# Patient Record
Sex: MALE | URBAN - METROPOLITAN AREA
[De-identification: names, ages, dates, MRNs, and addresses within clinical notes are randomized per-mention and may not be internally consistent; named-entity substitution may affect disease eponyms.]

---

## 2022-12-05 ENCOUNTER — TELEPHONE (OUTPATIENT)
Dept: ORTHOPEDIC SURGERY | Age: 57
End: 2022-12-05

## 2022-12-06 ENCOUNTER — TELEPHONE (OUTPATIENT)
Dept: ORTHOPEDIC SURGERY | Age: 57
End: 2022-12-06

## 2022-12-06 NOTE — TELEPHONE ENCOUNTER
Called pt to see if he has had MRI. Pt stated he is having MRI on 12-8-22. Pt will bring disc to [facility] and have report sent to [facility].

## 2022-12-13 ENCOUNTER — OFFICE VISIT (OUTPATIENT)
Dept: ORTHOPEDIC SURGERY | Age: 57
End: 2022-12-13

## 2022-12-13 VITALS — BODY MASS INDEX: 32.58 KG/M2 | HEIGHT: 69 IN | WEIGHT: 220 LBS

## 2022-12-13 DIAGNOSIS — M87.076 AVASCULAR NECROSIS OF BONE OF FOOT (HCC): ICD-10-CM

## 2022-12-13 DIAGNOSIS — S92.252A CLOSED DISPLACED FRACTURE OF NAVICULAR BONE OF LEFT FOOT, INITIAL ENCOUNTER: ICD-10-CM

## 2022-12-13 DIAGNOSIS — M79.672 LEFT FOOT PAIN: Primary | ICD-10-CM

## 2022-12-13 DIAGNOSIS — M19.172 POST-TRAUMATIC OSTEOARTHRITIS OF LEFT FOOT: ICD-10-CM

## 2022-12-13 NOTE — PROGRESS NOTES
The patient was prescribed a walker boot for the patient's left foot. The patient wears a size 11 shoe and I fitted them with a L size boot. The patient was fitted and instructed on the use of prescribed walker boot. I explained how to fit themselves and that the plastic flexible piece should always be on the front of the boot and secured by the Velcro straps on top. The air bladder in the boot was adjusted according to proper fit and comfort. The patient walked a short distance and acknowledged satisfaction with current fit. I also explained that they need a heel lift or a higher heeled shoe for the uninvolved LE to help normalize gait and avoid excessive low back stress/strain due to leg length inequality created from walker boot. Patient read and signed documenting they understand and agree to [facility]'s current DME return policy.

## 2022-12-13 NOTE — LETTER
DME Patient Authorization Form    Name: Magdaline Sacks  : 1965  MRN: [id number]   Age: 59 y.o. Gender: male  Delivery Address: [facility]     Diagnosis:     ICD-10-CM    1. Left foot pain  M79.672 XR ANKLE LEFT (MIN 3 VIEWS)     XR FOOT LEFT (2 VIEWS)     Seth Leung ()     Ambulatory referral to Orthopedic Surgery           Requested DME:  Seth Leung -  ($290.00) X 1 - left        Clinical Notes:     **Indicates non-covered items by insurance. Payment expected on date of service. Electronically signed by  Provider: Kaushal Canela MD__Date: 2022                            [facility]/[address] Tax ID # [id number]        Durable Medical Equipment and/or Orthotics Patient Consent     I understand that my physician has prescribed this medical supply as part of my treatment plan as a matter of Medical Necessity.  I understand that I have a choice in where I receive my prescribed orthopedic supplies and/or services.  I authorize [facility] to furnish this service/product and to provide my insurance carrier with any information requested in order to process for payment.  I instruct my insurance carrier to pay [facility] directly for these services/products.  I understand that my insurance carrier may deny payment for this supply because it is a non-covered item, deemed not medically necessary or considered experimental.   I understand that any cost not covered by my insurance carrier will be solely my financial responsibility.  I have received the Supplier Standards and have reviewed them.  I have received the prescribed item and have been fully instructed on the proper use of the above services/products.    ______ (Patient Initials) I understand that all DME items are non-returnable after being dispensed.  Items still in sealed packaging may be returned up to 14 days after purchasing. [address] will replace items that are defective.    ______ (Patient Initials) I understand that April Garcia will not file a claim with my insurance carrier for this service/product and I am waiving my right to file a claim on my own for this service/product with my insurance company as this item is NON-COVERED (Denoted by the **) by my Insurance company/policy. ______ (Patient Initials) I understand that I am responsible to bring my equipment to the hospital for any surgery. ______________________________________________  ________________________  Patient / Ruben [place]            Thank you for considering [address] Your physician has prescribed specific medical equipment or devices for your home use. The following describes your rights and responsibilities as our customer. Right to Choose Providers: You have a choice regarding which company supplies your home medical equipment and devices, and to consult your physician in this decision. You may choose a medical supply store, a home medical equipment provider, or a specialist such as [facility]. [facility] will coordinate with your physician to provide the medical equipment or devices prescribed for your home use. Right to Service:  You have the right to considerate, respectful and nondiscriminatory care. You have the right to receive accurate and easily understood information about your health care. If you speak a foreign language, or don't understand the discussions, assistance will be provided to allow you to make informed health care decisions. You have the right to know your treatment options and to participate in decisions about your care, including the right to accept or refuse treatment.   You have the right to expect a reasonable response to your requests applicable Federal and State licensure and regulatory requirements. A supplier must provide complete and accurate information on the DMEPOS supplier application. Any changes to this information must be reported to the [facility] within 30 days. An authorized individual (one whose signature is binding) must sign the application for billing privileges. A supplier must fill orders from its own inventory, or must contract with other companies for the purchase of items necessary to fill the order. A supplier may not contract with any entity that is currently excluded from the Medicare program, any [facility] program, or from any other Federal procurement or Nonprocurement programs. A supplier must advise beneficiaries that they may rent or purchase inexpensive or routinely purchased durable medical equipment, and of the purchase option for capped rental equipment. A supplier must notify beneficiaries of warranty coverage and honor all warranties under applicable State Law, and repair or replace free of charge Medicare covered items that are under warranty. A supplier must maintain a physical facility on an appropriate site. A supplier must permit CMS, or its agents to conduct on-site inspections to ascertain the supplier's compliance with these standards. The supplier location must be accessible to beneficiaries during reasonable business hours, and must maintain a visible sign and posted hours of operation. A supplier must maintain a primary business telephone listed under the name of the business in a [employer] or a toll free number available through directory assistance. The exclusive use of a beeper, answering machine or cell phone is prohibited. A supplier must have comprehensive liability insurance in the amount of at least $300,000 that covers both the supplier's place of business and all customers and employees of the supplier.   If the supplier manufactures its own items, this insurance must also cover product liability and completed operations. A supplier must agree not to initiate telephone contact with beneficiaries, with a few exceptions allowed. This standard prohibits suppliers from calling beneficiaries in order to solicit new business. A supplier is responsible for delivery and must instruct beneficiaries on use of Medicare covered items, and maintain proof of delivery. A supplier must answer questions, and respond to complaints of the beneficiaries, and maintain documentation of such contacts. A supplier must maintain and replace at no charge or repair directly, or through a service contract with another company, Medicare covered items it has rented to beneficiaries. A supplier must accept returns of substandard (less than full quality for the particular item) or unsuitable items (inappropriate for the beneficiary at the time it was fitted and rented or sold) from beneficiaries. A supplier must disclose these supplier standards to each beneficiary to whom it supplies a Medicare-covered item. A supplier must disclose to the government any person having ownership, financial, or control interest in the supplier. A supplier must not convey or reassign a supplier number; i.e., the supplier may not sell or allow another entity to use its Medicare billing number. A supplier must have a complaint resolution protocol established to address beneficiary complaints that relate to these standards. A record of these complaints must be maintained at the physical facility. Complaint records must include: the name, address, telephone number and health insurance claim number of the beneficiary, a summary of the complaint, and any action taken to resolve it. A supplier must agree to furnish CMS any information required by the Medicare statute and implementing regulations.   A supplier of DMEPOS and other items and services must be accredited by a CMS-approved accreditation organization in order to receive and retain a supplier billing number. The accreditation must indicate the specific products and services, for which the supplier is accredited in order for the supplier to receive payment for those specific products and services. A DMEPOS supplier must notify their accreditation organization when a new DMEPOS location is opened. The accreditation organization may accredit the new supplier location for three months after it is operational without requiring a new site visit. All DMEPOS supplier locations, whether owned or subcontracted, must meet the [employer] and be separately accredited in order to bill Medicare. An accredited supplier may be denied enrollment or their enrollment may be revoked, if CMS determines that they are not in compliance with the DMEPOS quality standards. A DMEPOS supplier must disclose upon enrollment all products and services, including the addition of new product lines for which they are seeking accreditation. If a new product line is added after enrollment, the DMEPOS supplier will be responsible for notifying the accrediting body of the new product so that the DMEPOS supplier can be re-surveyed and accredited for these new products. Must meet the surety bond requirements specified in 42 C. F.R. 424.57(c). Implementation date- May 4, 2009. A supplier must obtain oxygen from a state-licensed oxygen supplier. A supplier must maintain ordering and referring documentation consistent with provisions found in 42 C. F.R. 424.516(f). DMEPOS suppliers are prohibited from sharing a practice location with certain other Medicare providers and suppliers. DMEPOS suppliers must remain open to the public for a minimum of 30 hours per week with certain exceptions.

## 2022-12-13 NOTE — PROGRESS NOTES
Name: Ze Dumas  YOB: 1965  Gender: male  MRN: [id number]     CC: Left foot pain    HPI:   August 2022: He recalls returning from the beach noticing left foot pain and swelling  He initially treated with colchicine for suspected gout  09/02/2022: XR with sclerosis of the tarsal navicular with questionable collapse or nonunion fracture. MRI scan was ordered  12/09/2022: Left foot MRI scan  12/13/2022: He presents to assess his foot. He has been working his regular job at [employer] since the onset of the symptoms. ROS/Meds/PSH/PMH/FH/SH: reviewed today    Tobacco:  reports that he has never smoked. He has never used smokeless tobacco.     Physical Examination:  Patient appears to be alert and oriented with acceptable appearance. No obvious distress or SOB  CV: appears to have acceptable vascular color and capillary refill  Neuro: appears to have mostly intact light touch sensation   Skin: Left hindfoot soft tissue thickening  MS: Standing: Plantigrade: Gait mildly protected  Left = medial ray pain; pain with tarsal stress testing; near rigid hindfoot    XR: Left side: Standing AP lateral mortise ankle plus AP oblique foot taken today with comminuted navicular fracture nonunion with AVN and surrounding talonavicular and navicular cuneiform collapse arthritis; changes calcaneonavicular but no appreciable cuboid collapse  XR Impression:  As above      Reviewed Test/Records/Documents:   11/16/2022: Dr. Shawn Covington records reviewed: X-ray report reviewed  12/09/2022: Left foot MRI scan without contrast: Radiologic impression:  1. Longitudinal fracture to the central navicular consistent with findings on 09/02/2022 radiograph. Since that time the navicular has collapsed and there is loss of normal fatty marrow signal that is most consistent with osteonecrosis  2. Nondisplaced subchondral fractures of the anterior cuboid and base fourth metatarsal bone contusion at the base of the third  3.   Avulsion fracture of the anterior talus involving the origin of the dorsal talonavicular ligament  4. Longitudinal split tear of the peroneus brevis and longus with peroneal tenosynovitis    Assessment:    Left collapsed comminuted navicular fracture nonunion, talonavicular, naviculocuneiform arthritis  Left peroneus brevis and longus tendon tears  Left MRI scan nondisplaced subchondral fractures anterior cuboid and base fourth metatarsal    Plan:   The patient and I discussed the above assessment. We explored treatment options. He has multiple findings on the MRI scan, but the most important is his avascular collapsed navicular nonunion  Unfortunately, his fracture gap separation and avascular necrosis with arthritis will require ORIF and fusion  Regarding his peroneal tendons, those can be addressed at the time of surgery, but his main issue is the navicular  He understands my thumb debilities and he will need to see surgical partner. Advanced medical imaging: No CT scan ordered at this time but we discussed my partner may require CT scan  DME: Placed in 3D boot  We discussed foot care and boot protection  PT: No indication  Orthotic/prosthetic: Future consideration for custom insoles       Medication - OTC meds prn:  Surgical discussion: Outlined surgery in detail today. He understands my thumb debilities. Surgical details up to my surgical partner. Left navicular ORIF bone grafting; talonavicular fusion: Naviculocuneiform fusions  Left possible peroneal tendon reconstruction  Follow up: Surgical partner  Work status: Limited standing and walking in 3D boot     This note was created using Dragon voice recognition software which may result in errors of speech and spelling recognition and word/phrase syntax errors.

## 2022-12-16 ENCOUNTER — OFFICE VISIT (OUTPATIENT)
Dept: ORTHOPEDIC SURGERY | Age: 57
End: 2022-12-16
Payer: COMMERCIAL

## 2022-12-16 VITALS — BODY MASS INDEX: 32.21 KG/M2 | WEIGHT: 225 LBS | HEIGHT: 70 IN

## 2022-12-16 DIAGNOSIS — S92.252A CLOSED DISPLACED FRACTURE OF NAVICULAR BONE OF LEFT FOOT, INITIAL ENCOUNTER: Primary | ICD-10-CM

## 2022-12-16 PROCEDURE — 99214 OFFICE O/P EST MOD 30 MIN: CPT | Performed by: ORTHOPAEDIC SURGERY

## 2022-12-16 RX ORDER — OMEPRAZOLE 20 MG/1
20 TABLET, DELAYED RELEASE ORAL DAILY
COMMUNITY
Start: 2020-06-02

## 2022-12-16 RX ORDER — ALLOPURINOL 100 MG/1
TABLET ORAL
COMMUNITY
Start: 2022-12-01

## 2022-12-16 NOTE — PROGRESS NOTES
Name: Richard Quijano  YOB: 1965  Gender: male  MRN: [id number]    Summary: Left navicular fracture nonunion. (AVN?) Proceed with left calcaneal bone graft, left gastrocnemius Dara, left naviculocuneiform fusion with staples and screws. Peroneal tears w/ no pain - no surgery on it    Popliteal saphenous block     CC: left navicular non union    HPI: Richard Quijano is a 59 y.o. male with left navicular non union. He states this past summer - 5 months ago in August 2022 -started developing foot and ankle pain after returning from the beach. He treated this with colchicine as he thought it was gout. He then went to an urgent care they did not see a fracture. He was placed into a boot. He then another x-ray in September 2022 which showed sclerosis of the tarsal navicular with potential nonunion and collapse. An MRI was ordered. He then was referred to Dr. Wiliam Emmanuel who worked him up and states that they are concerned about a fracture nonunion. Patient states he is not getting better, he has been in a boot for a long period of time, multiple months, and he states that the more he walks the worse it gets. He denies any lateral ankle pain. He denies any lateral sided pain. All the pain he described medial foot. Attempted Treatment: boot    Works on his feet at [employer]  ROS/Meds/PSH/PM/FH/SH: I personally reviewed the patients standard intake form. Below are the pertinents    Tobacco:  reports that he has never smoked. He has never used smokeless tobacco.  Diabetes: None  No results found for: LABA1C  No results found for: EAG  Other: none    Physical Examination:  left lower: TTP . +silt s/s/sp/dp/t. 5/5 strength to EHL/FHL/AT/PT/Arturo/Achilles. toes wwp w/ BCR <2s. No open wounds. No pain with calf squeeze. TTP @    Tender at the navicular bone with significant edema noted at the navicular bone. Nontender over the peroneal's. No pain with resisted eversion of the peroneal's. Nontender at the sinus Tarsi. normal silverskoid exam: With the hindfoot in neutral and forefoot supinated there is good ankle dorsiflexion with the knee flexed and extended. Neutral hindfoot alignment. No cavovarus nor planovalgus foot deformity  Talar tilt exam : normal  Anterior drawer exam w/ ankle plantarflexed at 20 deg: normal    Neuro:  normal SILT to s/s/sp/dp/t. Reflexes normal: 1+ patella reflex bilaterally, 1+ achilles reflex bilaterally, negative babinski bilaterally. no signs of hyper reflexia or absent reflex    Vascular: Bilateral DP and PT: dorsalis pedis 4/4    Imaging:   I reviewed imaging performed by another physician in my group. I reviewed the x-rays taken by my partner of the left foot and the left ankle on 12/13/2022  There is significant fragmentation and a nonunion of the navicular bone indicating navicular nonunion with suspected AVN type features. No other signs of acute fractures. There is some obvious edema issues. 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 Longitudinal fracture to the central navicular consistent with findings on 09/02/2022 radiograph. Since that time the navicular has collapsed and there is loss of normal fatty marrow signal that is most consistent with osteonecrosis  2. Nondisplaced subchondral fractures of the anterior cuboid and base fourth metatarsal bone contusion at the base of the third  3. Avulsion fracture of the anterior talus involving the origin of the dorsal talonavicular ligament  4.   Longitudinal split tear of the peroneus brevis and longus with peroneal tenosynovitis      Asssesmnt:   Navicular fracture non union    Plan:   4 This is a chronic illness/condition with exacerbation and progression  Treatment at this time: Elective major surgery with procedural risk factors  Studies ordered: NO XR needed @ Next Visit    Weight-bearing status: Heel WB        Return to work/work restrictions: none  No medications given    Jomarie Severe MD    We discussed continuing nonoperative treatment. I discussed discussed this is AVN and I suspect it will not spontaneously heal.  I did explain he may have good blood flow but he may have no blood flow to this navicular bone. We also discussed booting and just symptomatic management. After this discussion he elected to proceed with surgery. I discussed surgery to be talonavicular fusion with autograft. I explained the risk of nonunion. I explained the risk of revision surgeries. I explained plates and screws construct. I discussed surgery with this patient and the risk associated with surgery. These risk include infection, delayed wound healing, hardware failure, painful hardware, recurring wounds, recurring pain, damage to surrounding structures such as nerves, vessels, tendons, ligaments, and joints. I discussed how no surgery is perfect and this surgery may not be successful. There is also risk of anesthesia such as nerve damage from local anesthetic, damage to the airway or mouth structures, respiratory distress, cardiac disease exacerbation, potential arrhythmias, and even death. The patient verbalized understanding of each of these risk and elected to proceed with surgery. History reviewed. No pertinent past medical history.       Current Outpatient Medications:     omeprazole (PRILOSEC OTC) 20 MG tablet, Take 20 mg by mouth daily, Disp: , Rfl:     allopurinol (ZYLOPRIM) 100 MG tablet, , Disp: , Rfl:

## 2022-12-16 NOTE — H&P (VIEW-ONLY)
Name: Abigail Bloom  YOB: 1965  Gender: male  MRN: [id number]    Summary: Left navicular fracture nonunion. (AVN?) Proceed with left calcaneal bone graft, left gastrocnemius Dara, left naviculocuneiform fusion with staples and screws. Peroneal tears w/ no pain - no surgery on it    Popliteal saphenous block     CC: left navicular non union    HPI: Abigail Bloom is a 59 y.o. male with left navicular non union. He states this past summer - 5 months ago in August 2022 -started developing foot and ankle pain after returning from the beach. He treated this with colchicine as he thought it was gout. He then went to an urgent care they did not see a fracture. He was placed into a boot. He then another x-ray in September 2022 which showed sclerosis of the tarsal navicular with potential nonunion and collapse. An MRI was ordered. He then was referred to Dr. Carmelina Mclaughlin who worked him up and states that they are concerned about a fracture nonunion. Patient states he is not getting better, he has been in a boot for a long period of time, multiple months, and he states that the more he walks the worse it gets. He denies any lateral ankle pain. He denies any lateral sided pain. All the pain he described medial foot. Attempted Treatment: boot    Works on his feet at [employer]  ROS/Meds/PSH/PM/FH/SH: I personally reviewed the patients standard intake form. Below are the pertinents    Tobacco:  reports that he has never smoked. He has never used smokeless tobacco.  Diabetes: None  No results found for: LABA1C  No results found for: EAG  Other: none    Physical Examination:  left lower: TTP . +silt s/s/sp/dp/t. 5/5 strength to EHL/FHL/AT/PT/Arturo/Achilles. toes wwp w/ BCR <2s. No open wounds. No pain with calf squeeze. TTP @    Tender at the navicular bone with significant edema noted at the navicular bone. Nontender over the peroneal's. No pain with resisted eversion of the peroneal's. Nontender at the sinus Tarsi. normal silverskoid exam: With the hindfoot in neutral and forefoot supinated there is good ankle dorsiflexion with the knee flexed and extended. Neutral hindfoot alignment. No cavovarus nor planovalgus foot deformity  Talar tilt exam : normal  Anterior drawer exam w/ ankle plantarflexed at 20 deg: normal    Neuro:  normal SILT to s/s/sp/dp/t. Reflexes normal: 1+ patella reflex bilaterally, 1+ achilles reflex bilaterally, negative babinski bilaterally. no signs of hyper reflexia or absent reflex    Vascular: Bilateral DP and PT: dorsalis pedis 4/4    Imaging:   I reviewed imaging performed by another physician in my group. I reviewed the x-rays taken by my partner of the left foot and the left ankle on 12/13/2022  There is significant fragmentation and a nonunion of the navicular bone indicating navicular nonunion with suspected AVN type features. No other signs of acute fractures. There is some obvious edema issues. 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 Longitudinal fracture to the central navicular consistent with findings on 09/02/2022 radiograph. Since that time the navicular has collapsed and there is loss of normal fatty marrow signal that is most consistent with osteonecrosis  2. Nondisplaced subchondral fractures of the anterior cuboid and base fourth metatarsal bone contusion at the base of the third  3. Avulsion fracture of the anterior talus involving the origin of the dorsal talonavicular ligament  4.   Longitudinal split tear of the peroneus brevis and longus with peroneal tenosynovitis      Asssesmnt:   Navicular fracture non union    Plan:   4 This is a chronic illness/condition with exacerbation and progression  Treatment at this time: Elective major surgery with procedural risk factors  Studies ordered: NO XR needed @ Next Visit    Weight-bearing status: Heel WB        Return to work/work restrictions: none  No medications given    Garland Briones MD    We discussed continuing nonoperative treatment. I discussed discussed this is AVN and I suspect it will not spontaneously heal.  I did explain he may have good blood flow but he may have no blood flow to this navicular bone. We also discussed booting and just symptomatic management. After this discussion he elected to proceed with surgery. I discussed surgery to be talonavicular fusion with autograft. I explained the risk of nonunion. I explained the risk of revision surgeries. I explained plates and screws construct. I discussed surgery with this patient and the risk associated with surgery. These risk include infection, delayed wound healing, hardware failure, painful hardware, recurring wounds, recurring pain, damage to surrounding structures such as nerves, vessels, tendons, ligaments, and joints. I discussed how no surgery is perfect and this surgery may not be successful. There is also risk of anesthesia such as nerve damage from local anesthetic, damage to the airway or mouth structures, respiratory distress, cardiac disease exacerbation, potential arrhythmias, and even death. The patient verbalized understanding of each of these risk and elected to proceed with surgery. History reviewed. No pertinent past medical history.       Current Outpatient Medications:     omeprazole (PRILOSEC OTC) 20 MG tablet, Take 20 mg by mouth daily, Disp: , Rfl:     allopurinol (ZYLOPRIM) 100 MG tablet, , Disp: , Rfl:

## 2022-12-20 PROBLEM — M79.672 PAIN OF LEFT MIDFOOT: Status: ACTIVE | Noted: 2022-12-20

## 2022-12-22 ENCOUNTER — TELEPHONE (OUTPATIENT)
Dept: ORTHOPEDIC SURGERY | Age: 57
End: 2022-12-22

## 2022-12-22 NOTE — TELEPHONE ENCOUNTER
He is having surgery Jan 4. He would like to get paperwork for a handicap placard. Please call him when its ready and he will pick it up. He would like to get it today.

## 2022-12-28 RX ORDER — MELOXICAM 15 MG/1
15 TABLET ORAL NIGHTLY
COMMUNITY
Start: 2022-12-20

## 2022-12-28 NOTE — PERIOP NOTE
Patient verified name and . Order for consent NOT found in EHR; patient verifies procedure from case posting. Type 1B surgery, phone assessment complete. Orders NOT received. Labs per surgeon: Unknown  Labs per anesthesia protocol: None per protocol    Patient answered medical/surgical history questions at their best of ability. All prior to admission medications documented in [facility]. If you have not heard from Mayra CLINT Emerson by 2 pm, please call [phone]. Patient instructed to take the following medications the day of surgery according to anesthesia guidelines with a small sip of water: none - patient takes all medications at night. On the day before surgery please take Acetaminophen 1000mg in the morning and then again before bed. You may substitute for Tylenol 650 mg. Hold all vitamins 7 days prior to surgery and NSAIDS 5 days prior to surgery. Prescription meds to hold: meloxicam    Patient instructed on the following:    > Arrive at [facility], time of arrival to be called the day before by 1700  > NPO after midnight, unless otherwise indicated, including gum, mints, and ice chips  > Responsible adult must drive patient to the hospital, stay during surgery, and patient will need supervision 24 hours after anesthesia  > Use antibacterial soap in shower the night before surgery and on the morning of surgery  > All piercings must be removed prior to arrival.    > Leave all valuables (money and jewelry) at home but bring insurance card and ID on DOS.   > You may be required to pay a deductible or co-pay on the day of your procedure. You can pre-pay by calling [phone] if your surgery is at the [facility] or [phone] if your surgery is at the [facility]. > Do not wear make-up, nail polish, lotions, cologne, perfumes, powders, or oil on skin. Artificial nails are not permitted.

## 2022-12-28 NOTE — PERIOP NOTE
Dear  Tio,      Thank you for completing your phone assessment with me today. Here are your requested surgery instructions. Please call #[phone] with any questions/concerns. Your surgery is scheduled at [facility]: [address], [id number]. Please arrive at Outpatient Entrance. The Pre-op department (#[phone] for MAIN [phone] for Outpatient) will call you on the business day before your surgery with your arrival time. If you have any questions on the day of surgery, please call the pre-op dept. at the telephone number above. If you have not received a telephone call from Outpatient by 2 pm the business day before surgery, please call Outpatient # listed above. If you have not received a call from Main Pre op by 5 pm the business day before surgery please call MAIN # listed above - thank you! If you are sick the day of surgery: fever >100 deg F, coughing up colored mucus, or have abdominal sickness (intractable nausea, vomiting or diarrhea) please call [phone] the day of surgery as early as possible and speak to a nurse about your symptoms. They will advise you on next steps. No food or drink after midnight which includes any gum, mints, candy, or ice chips. Please take these medications on the morning of surgery with a small sip of water: NONE - patient takes all medications at night. On the day before surgery take Acetaminophen 1000mg in the morning and at bedtime OR Acetaminophen 650mg in the morning, afternoon and bedtime. Please stop all vitamins/supplements 7 days prior to surgery and stop all NSAIDS (ibuprofen, naproxen, aleve, motrin, advil) 5 days before your surgery. A responsible adult must drive you to the hospital, remain in the building during surgery and you will need adult supervision for 24 hours after anesthesia.     Please use an antibacterial soap (Dial, Safeguard, etc.) the night before surgery and on the morning of surgery. Do NOT wear: deodorant, make-up, nail polish, lotions, cologne, perfumes, powders or oil on your skin. All piercings/metal/jewelry must be removed prior to arrival.  If you wear contacts then you will need to bring a case to store them in or wear your glasses. Artificial nails are not permitted. Please leave all your valuables at home but be sure to bring your insurance card and ID on the day of surgery for registration/identification. Our Guide to Surgery with additional information can be found:  [email or web address]    Emailed to: [email or web address]

## 2023-01-02 DIAGNOSIS — S92.252A CLOSED DISPLACED FRACTURE OF NAVICULAR BONE OF LEFT FOOT, INITIAL ENCOUNTER: Primary | ICD-10-CM

## 2023-01-03 ENCOUNTER — ANESTHESIA EVENT (OUTPATIENT)
Dept: SURGERY | Age: 58
End: 2023-01-03
Payer: COMMERCIAL

## 2023-01-03 RX ORDER — ONDANSETRON 4 MG/1
4 TABLET, FILM COATED ORAL DAILY PRN
Qty: 30 TABLET | Refills: 0 | Status: SHIPPED | OUTPATIENT
Start: 2023-01-03

## 2023-01-03 RX ORDER — DOCUSATE SODIUM 100 MG/1
100 CAPSULE, LIQUID FILLED ORAL DAILY PRN
Qty: 30 CAPSULE | Refills: 0 | Status: SHIPPED | OUTPATIENT
Start: 2023-01-03

## 2023-01-03 RX ORDER — ASPIRIN 81 MG/1
81 TABLET ORAL 2 TIMES DAILY
Qty: 21 TABLET | Refills: 0 | Status: SHIPPED | OUTPATIENT
Start: 2023-01-03

## 2023-01-03 RX ORDER — OXYCODONE HYDROCHLORIDE AND ACETAMINOPHEN 5; 325 MG/1; MG/1
1 TABLET ORAL EVERY 6 HOURS PRN
Qty: 30 TABLET | Refills: 0 | Status: SHIPPED | OUTPATIENT
Start: 2023-01-03 | End: 2023-01-08

## 2023-01-04 ENCOUNTER — HOSPITAL ENCOUNTER (OUTPATIENT)
Age: 58
Setting detail: OUTPATIENT SURGERY
Discharge: HOME | End: 2023-01-04
Attending: ORTHOPAEDIC SURGERY | Admitting: ORTHOPAEDIC SURGERY
Payer: COMMERCIAL

## 2023-01-04 ENCOUNTER — APPOINTMENT (OUTPATIENT)
Dept: GENERAL RADIOLOGY | Age: 58
End: 2023-01-04
Attending: ORTHOPAEDIC SURGERY
Payer: COMMERCIAL

## 2023-01-04 ENCOUNTER — ANESTHESIA (OUTPATIENT)
Dept: SURGERY | Age: 58
End: 2023-01-04
Payer: COMMERCIAL

## 2023-01-04 VITALS
DIASTOLIC BLOOD PRESSURE: 80 MMHG | TEMPERATURE: 98.3 F | WEIGHT: 225 LBS | HEIGHT: 70 IN | RESPIRATION RATE: 18 BRPM | OXYGEN SATURATION: 99 % | BODY MASS INDEX: 32.21 KG/M2 | SYSTOLIC BLOOD PRESSURE: 165 MMHG | HEART RATE: 59 BPM

## 2023-01-04 DIAGNOSIS — M79.672 PAIN OF LEFT MIDFOOT: ICD-10-CM

## 2023-01-04 PROCEDURE — 3600000014 HC SURGERY LEVEL 4 ADDTL 15MIN: Performed by: ORTHOPAEDIC SURGERY

## 2023-01-04 PROCEDURE — 7100000010 HC PHASE II RECOVERY - FIRST 15 MIN: Performed by: ORTHOPAEDIC SURGERY

## 2023-01-04 PROCEDURE — 2500000003 HC RX 250 WO HCPCS: Performed by: ANESTHESIOLOGY

## 2023-01-04 PROCEDURE — 3600000004 HC SURGERY LEVEL 4 BASE: Performed by: ORTHOPAEDIC SURGERY

## 2023-01-04 PROCEDURE — 6360000002 HC RX W HCPCS

## 2023-01-04 PROCEDURE — 2580000003 HC RX 258

## 2023-01-04 PROCEDURE — 64447 NJX AA&/STRD FEMORAL NRV IMG: CPT | Performed by: ANESTHESIOLOGY

## 2023-01-04 PROCEDURE — C1734 ORTH/DEVIC/DRUG BN/BN,TIS/BN: HCPCS | Performed by: ORTHOPAEDIC SURGERY

## 2023-01-04 PROCEDURE — 3700000001 HC ADD 15 MINUTES (ANESTHESIA): Performed by: ORTHOPAEDIC SURGERY

## 2023-01-04 PROCEDURE — 64445 NJX AA&/STRD SCIATIC NRV IMG: CPT | Performed by: ANESTHESIOLOGY

## 2023-01-04 PROCEDURE — 7100000001 HC PACU RECOVERY - ADDTL 15 MIN: Performed by: ORTHOPAEDIC SURGERY

## 2023-01-04 PROCEDURE — 2709999900 HC NON-CHARGEABLE SUPPLY: Performed by: ORTHOPAEDIC SURGERY

## 2023-01-04 PROCEDURE — 7100000000 HC PACU RECOVERY - FIRST 15 MIN: Performed by: ORTHOPAEDIC SURGERY

## 2023-01-04 PROCEDURE — 3700000000 HC ANESTHESIA ATTENDED CARE: Performed by: ORTHOPAEDIC SURGERY

## 2023-01-04 PROCEDURE — 6360000002 HC RX W HCPCS: Performed by: ANESTHESIOLOGY

## 2023-01-04 PROCEDURE — C1713 ANCHOR/SCREW BN/BN,TIS/BN: HCPCS | Performed by: ORTHOPAEDIC SURGERY

## 2023-01-04 PROCEDURE — 6360000002 HC RX W HCPCS: Performed by: ORTHOPAEDIC SURGERY

## 2023-01-04 PROCEDURE — 2500000003 HC RX 250 WO HCPCS

## 2023-01-04 DEVICE — IMPLANTABLE DEVICE
Type: IMPLANTABLE DEVICE | Site: FOOT | Status: FUNCTIONAL
Brand: EASYFUSE™

## 2023-01-04 DEVICE — GRAFT BNE INJ 1.5 CC AUG: Type: IMPLANTABLE DEVICE | Site: FOOT | Status: FUNCTIONAL

## 2023-01-04 RX ORDER — SODIUM CHLORIDE 9 MG/ML
INJECTION, SOLUTION INTRAVENOUS PRN
Status: DISCONTINUED | OUTPATIENT
Start: 2023-01-04 | End: 2023-01-04 | Stop reason: HOSPADM

## 2023-01-04 RX ORDER — ROPIVACAINE HYDROCHLORIDE 5 MG/ML
INJECTION, SOLUTION EPIDURAL; INFILTRATION; PERINEURAL
Status: COMPLETED | OUTPATIENT
Start: 2023-01-04 | End: 2023-01-04

## 2023-01-04 RX ORDER — LIDOCAINE HYDROCHLORIDE 10 MG/ML
1 INJECTION, SOLUTION INFILTRATION; PERINEURAL
Status: DISCONTINUED | OUTPATIENT
Start: 2023-01-04 | End: 2023-01-04 | Stop reason: HOSPADM

## 2023-01-04 RX ORDER — FENTANYL CITRATE 50 UG/ML
100 INJECTION, SOLUTION INTRAMUSCULAR; INTRAVENOUS
Status: COMPLETED | OUTPATIENT
Start: 2023-01-04 | End: 2023-01-04

## 2023-01-04 RX ORDER — SODIUM CHLORIDE, SODIUM LACTATE, POTASSIUM CHLORIDE, CALCIUM CHLORIDE 600; 310; 30; 20 MG/100ML; MG/100ML; MG/100ML; MG/100ML
INJECTION, SOLUTION INTRAVENOUS CONTINUOUS PRN
Status: DISCONTINUED | OUTPATIENT
Start: 2023-01-04 | End: 2023-01-04 | Stop reason: SDUPTHER

## 2023-01-04 RX ORDER — PROPOFOL 10 MG/ML
INJECTION, EMULSION INTRAVENOUS PRN
Status: DISCONTINUED | OUTPATIENT
Start: 2023-01-04 | End: 2023-01-04 | Stop reason: SDUPTHER

## 2023-01-04 RX ORDER — MIDAZOLAM HYDROCHLORIDE 2 MG/2ML
2 INJECTION, SOLUTION INTRAMUSCULAR; INTRAVENOUS
Status: COMPLETED | OUTPATIENT
Start: 2023-01-04 | End: 2023-01-04

## 2023-01-04 RX ORDER — SODIUM CHLORIDE 0.9 % (FLUSH) 0.9 %
5-40 SYRINGE (ML) INJECTION PRN
Status: DISCONTINUED | OUTPATIENT
Start: 2023-01-04 | End: 2023-01-04 | Stop reason: HOSPADM

## 2023-01-04 RX ORDER — PHENYLEPHRINE HYDROCHLORIDE 10 MG/ML
INJECTION INTRAVENOUS PRN
Status: DISCONTINUED | OUTPATIENT
Start: 2023-01-04 | End: 2023-01-04 | Stop reason: SDUPTHER

## 2023-01-04 RX ORDER — PROPOFOL 10 MG/ML
INJECTION, EMULSION INTRAVENOUS CONTINUOUS PRN
Status: DISCONTINUED | OUTPATIENT
Start: 2023-01-04 | End: 2023-01-04 | Stop reason: SDUPTHER

## 2023-01-04 RX ORDER — HYDROMORPHONE HCL 110MG/55ML
0.25 PATIENT CONTROLLED ANALGESIA SYRINGE INTRAVENOUS EVERY 5 MIN PRN
Status: DISCONTINUED | OUTPATIENT
Start: 2023-01-04 | End: 2023-01-04 | Stop reason: HOSPADM

## 2023-01-04 RX ORDER — PROCHLORPERAZINE EDISYLATE 5 MG/ML
5 INJECTION INTRAMUSCULAR; INTRAVENOUS
Status: DISCONTINUED | OUTPATIENT
Start: 2023-01-04 | End: 2023-01-04 | Stop reason: HOSPADM

## 2023-01-04 RX ORDER — SODIUM CHLORIDE 0.9 % (FLUSH) 0.9 %
5-40 SYRINGE (ML) INJECTION EVERY 12 HOURS SCHEDULED
Status: DISCONTINUED | OUTPATIENT
Start: 2023-01-04 | End: 2023-01-04 | Stop reason: HOSPADM

## 2023-01-04 RX ORDER — HYDRALAZINE HYDROCHLORIDE 20 MG/ML
10 INJECTION INTRAMUSCULAR; INTRAVENOUS
Status: DISCONTINUED | OUTPATIENT
Start: 2023-01-04 | End: 2023-01-04 | Stop reason: HOSPADM

## 2023-01-04 RX ORDER — LABETALOL HYDROCHLORIDE 5 MG/ML
10 INJECTION, SOLUTION INTRAVENOUS
Status: DISCONTINUED | OUTPATIENT
Start: 2023-01-04 | End: 2023-01-04 | Stop reason: HOSPADM

## 2023-01-04 RX ORDER — HYDROMORPHONE HCL 110MG/55ML
0.5 PATIENT CONTROLLED ANALGESIA SYRINGE INTRAVENOUS EVERY 5 MIN PRN
Status: DISCONTINUED | OUTPATIENT
Start: 2023-01-04 | End: 2023-01-04 | Stop reason: HOSPADM

## 2023-01-04 RX ORDER — FENTANYL CITRATE 50 UG/ML
INJECTION, SOLUTION INTRAMUSCULAR; INTRAVENOUS PRN
Status: DISCONTINUED | OUTPATIENT
Start: 2023-01-04 | End: 2023-01-04 | Stop reason: SDUPTHER

## 2023-01-04 RX ORDER — ONDANSETRON 2 MG/ML
4 INJECTION INTRAMUSCULAR; INTRAVENOUS
Status: DISCONTINUED | OUTPATIENT
Start: 2023-01-04 | End: 2023-01-04 | Stop reason: HOSPADM

## 2023-01-04 RX ORDER — LIDOCAINE HYDROCHLORIDE 20 MG/ML
INJECTION, SOLUTION EPIDURAL; INFILTRATION; INTRACAUDAL; PERINEURAL PRN
Status: DISCONTINUED | OUTPATIENT
Start: 2023-01-04 | End: 2023-01-04 | Stop reason: SDUPTHER

## 2023-01-04 RX ADMIN — LIDOCAINE HYDROCHLORIDE 40 MG: 20 INJECTION, SOLUTION EPIDURAL; INFILTRATION; INTRACAUDAL; PERINEURAL at 08:00

## 2023-01-04 RX ADMIN — SODIUM CHLORIDE, SODIUM LACTATE, POTASSIUM CHLORIDE, AND CALCIUM CHLORIDE: 600; 310; 30; 20 INJECTION, SOLUTION INTRAVENOUS at 09:30

## 2023-01-04 RX ADMIN — DEXAMETHASONE SODIUM PHOSPHATE 4 MG: 4 INJECTION, SOLUTION INTRAMUSCULAR; INTRAVENOUS at 07:26

## 2023-01-04 RX ADMIN — PHENYLEPHRINE HYDROCHLORIDE 200 MCG: 10 INJECTION INTRAVENOUS at 08:41

## 2023-01-04 RX ADMIN — Medication 2000 MG: at 08:03

## 2023-01-04 RX ADMIN — PHENYLEPHRINE HYDROCHLORIDE 200 MCG: 10 INJECTION INTRAVENOUS at 08:52

## 2023-01-04 RX ADMIN — ROPIVACAINE HYDROCHLORIDE 10 ML: 5 INJECTION, SOLUTION EPIDURAL; INFILTRATION; PERINEURAL at 07:32

## 2023-01-04 RX ADMIN — PROPOFOL 200 MCG/KG/MIN: 10 INJECTION, EMULSION INTRAVENOUS at 08:00

## 2023-01-04 RX ADMIN — SODIUM CHLORIDE, SODIUM LACTATE, POTASSIUM CHLORIDE, AND CALCIUM CHLORIDE: 600; 310; 30; 20 INJECTION, SOLUTION INTRAVENOUS at 07:51

## 2023-01-04 RX ADMIN — ROPIVACAINE HYDROCHLORIDE 20 ML: 5 INJECTION, SOLUTION EPIDURAL; INFILTRATION; PERINEURAL at 07:26

## 2023-01-04 RX ADMIN — FENTANYL CITRATE 25 MCG: 50 INJECTION, SOLUTION INTRAMUSCULAR; INTRAVENOUS at 08:25

## 2023-01-04 RX ADMIN — PHENYLEPHRINE HYDROCHLORIDE 100 MCG: 10 INJECTION INTRAVENOUS at 08:46

## 2023-01-04 RX ADMIN — PROPOFOL 60 MG: 10 INJECTION, EMULSION INTRAVENOUS at 08:00

## 2023-01-04 RX ADMIN — FENTANYL CITRATE 50 MCG: 50 INJECTION, SOLUTION INTRAMUSCULAR; INTRAVENOUS at 07:26

## 2023-01-04 RX ADMIN — MIDAZOLAM 2 MG: 1 INJECTION INTRAMUSCULAR; INTRAVENOUS at 07:26

## 2023-01-04 RX ADMIN — MEPIVACAINE HYDROCHLORIDE 10 ML: 15 INJECTION, SOLUTION EPIDURAL; INFILTRATION at 07:26

## 2023-01-04 ASSESSMENT — PAIN - FUNCTIONAL ASSESSMENT: PAIN_FUNCTIONAL_ASSESSMENT: 0-10

## 2023-01-04 NOTE — ANESTHESIA PROCEDURE NOTES
Peripheral Block    Patient location during procedure: pre-op  Reason for block: post-op pain management and at surgeon's request  Start time: 1/4/2023 7:32 AM  End time: 1/4/2023 7:33 AM  Staffing  Performed: anesthesiologist   Anesthesiologist: Ady Villavicencio DO  Preanesthetic Checklist  Completed: patient identified, IV checked, site marked, risks and benefits discussed, surgical/procedural consents, equipment checked, pre-op evaluation, timeout performed, anesthesia consent given, oxygen available and monitors applied/VS acknowledged  Peripheral Block   Patient position: supine  Prep: ChloraPrep  Provider prep: mask and sterile gloves  Patient monitoring: cardiac monitor, continuous pulse ox, frequent blood pressure checks, responsive to questions and oxygen  Block type: Femoral  Adductor canal  Laterality: left  Injection technique: single-shot  Guidance: ultrasound guided  Infiltration strength: 1 %  Dose: 3 mL    Needle   Needle type: insulated echogenic nerve stimulator needle   Needle gauge: 20 G  Needle localization: ultrasound guidance  Needle length: 10 cm  Assessment   Injection assessment: negative aspiration for heme, no paresthesia on injection, local visualized surrounding nerve on ultrasound and no intravascular symptoms  Paresthesia pain: none  Slow fractionated injection: yes  Hemodynamics: stable  Real-time US image taken/store: yes  Outcomes: uncomplicated and patient tolerated procedure well    Additional Notes  R/B/I discussed at length with pt including damage to nerve or muscle. Needle inserted under real time Ultrasound guidance and placed in close proximity to nerve being blocked. Ultrasound was used in real time to visualize spread of local anesthetic around nerve being blocked.   Nerve and surrounding structures appeared grossly normal.  A permanent Ultrasound image was stored in the chart  Medications Administered  dexamethasone 4 MG/ML - Perineural   4 mg - 1/4/2023 7:32:00 AM  mepivacaine 1.5 % - Perineural   10 mL - 1/4/2023 7:32:00 AM  ropivacaine (NAROPIN) injection 0.5% - Perineural   10 mL - 1/4/2023 7:32:00 AM

## 2023-01-04 NOTE — DISCHARGE INSTRUCTIONS
INSTRUCTIONS FOLLOWING FOOT SURGERY     ACTIVITY   Elevate foot. No Ice   1.)No weight bearing. Use crutches or knee walker until seen in follow up appointment     Blood clot prevention:   As instructed by Dr Jung Reeves: Take 81mg aspirin twice daily if ok with your medical doctor and you have no GI Ulcer. Get up and out of bed frequently. While in bed move the legs as much as possible. DRESSING CARE Keep dry and in place until follow up appointment. Cover with cast bag or plastic bag when showering. Let the office know if dressing gets saturated with water. Don't put anything into the splint to relieve itching etc.     CALL YOUR DOCTOR IF YOU HAVE   Excessive bleeding that does not stop after holding mild pressure over the area. Temperature of 101 degrees or above. Redness, excessive swelling or bruising, and/or green or yellow, smelly discharge from incision. Loss of sensation - cold, white or blue toes. DIET   Day of Surgery: Clear liquids until no nausea or vomiting; then light, bland diet (Baked chicken, plain rice, grits, scrambled egg, toast). Nothing Greasy, fried or spicy today   Advance to regular diet on second day, unless your doctor orders otherwise. PAIN   Take pain medications as directed by your doctor. Call your doctor if pain is NOT relieved by medication. After general anesthesia or intravenous sedation, for 24 hours or while taking prescription Narcotics:    Limit your activities    A responsible adult needs to be with you for the next 24 hours    Do not drive and operate hazardous machinery    Do not make important personal or business decisions    Do not drink alcoholic beverages    If you have not urinated within 8 hours after discharge, and you are experiencing discomfort from urinary retention, please go to the nearest ED. If you have sleep apnea and have a CPAP machine, please use it for all naps and sleeping.       Please use caution when taking narcotics and any of your home medications that may cause drowsiness. * Please give a list of your current medications to your Primary Care Provider. * Please update this list whenever your medications are discontinued, doses are   changed, or new medications (including over-the-counter products) are added. * Please carry medication information at all times in case of emergency situations. These are general instructions for a healthy lifestyle:    No smoking/ No tobacco products/ Avoid exposure to second hand smoke    Surgeon General's Warning: Quitting smoking now greatly reduces serious risk to your health. Obesity, smoking, and sedentary lifestyle greatly increases your risk for illness    A healthy diet, regular physical exercise & weight monitoring are important for maintaining a healthy lifestyle     You may be retaining fluid if you have a history of heart failure or if you experience any of the following symptoms: Weight gain of 3 pounds or more overnight or 5 pounds in a week, increased swelling in our hands or feet or shortness of breath while lying flat in bed. Please call your doctor as soon as you notice any of these symptoms; do not wait until your next office visit. Learning About How to Use Crutches     Your Care Instructions   Crutches can help you walk when you have an injured hip, leg, knee, ankle, or foot. Your doctor will tell you how much weight--if any--you can put on your leg. Be sure your crutches fit you. When you stand up in your normal posture, there should be space for two or three fingers between the top of the crutch and your armpit. When you let your hands hang down, the hand  should be at your wrists. When you put your hands on the hand , your elbows should be slightly bent. To stay safe when using crutches:    Look straight ahead, not down at your feet. Clear away small rugs, cords, or anything else that could cause you to trip, slip, or fall.     Be very careful around pets and small children. They can get in your path when you least expect it. Be sure the rubber tips on your crutches are clean and in good condition to help prevent slipping. Avoid slick conditions, such as wet floors and snowy or icy driveways. In bad weather, be especially careful on curbs and steps. How to use crutches   Getting ready to walk   1. Bend your elbows slightly. Press the padded top parts of the crutches against your sides, under your armpits. 2. If you have been told not to put any weight on your injured leg, keep that leg bent and off the ground. Walking with crutches   1. Put both crutches about 12 inches in front of you. 2. Put your weight on the handgrips, not on the pads under your arms. (Constant pressure against your underarms can cause numbness.) Swing your body forward. (If you have been told not to put any weight on your injured leg, keep that leg bent and off the ground.)   3. To complete the step, put your weight on the strong leg. 4. Move your crutches about 12 inches in front of you, and start the next step. 5. When you're confident using the crutches, you can move the crutches and your injured leg at the same time. Then push straight down on the crutches as you step past the crutches with your strong leg, as you would in normal walking. 6. Take small steps. 7. Use ramps and elevators when you can. Sitting down   1. To sit, back up to the chair. Use one hand to hold both crutches by the handgrips, beside your injured leg. With the other hand, hold onto the seat and slowly lower yourself onto the chair. 2. Lay the crutches on the ground near your chair. If you prop them up, they may fall over. Getting up from a chair   1. To get up from a chair,  the crutches and put them in one hand beside your injured leg. 2. Put your weight on the handgrips of the crutches and on your strong leg to stand up. Walking up stairs   1.  To go up stairs, step up with your strong leg and then bring the crutches and your injured leg to the upper step. 2. For stairs that have handrails: Put both crutches under the arm opposite the handrail. Use the hand opposite the handrail to hold both crutches by the handgrips. 3. Hold onto the handrail as you go up. Put your strong leg on the step first when you go up. Walking down stairs   1. To go down stairs, put your crutches and injured leg on the lower step. 2. Bring your strong leg to the lower step. This saying may help you remember: \"Up with the good, down with the bad. \"   3. For stairs that have handrails: Put both crutches under the arm opposite the handrail. Use the hand opposite the handrail to hold both crutches by the handgrips. Hold onto the handrail as you go down. Follow the same process you use for stairs: Lead with your crutches and injured leg on the way down.

## 2023-01-04 NOTE — ANESTHESIA POSTPROCEDURE EVALUATION
Department of Anesthesiology  Postprocedure Note    Patient: Fernando Corley  MRN: [id number]  YOB: 1965  Date of evaluation: 1/4/2023      Procedure Summary     Date: 01/04/23 Room / Location: [facility] OP OR 01 / [facility]    Anesthesia Start: 0751 Anesthesia Stop: 2341    Procedure: left navicular cuneiform fusion (Left: Foot) Diagnosis:       Pain of left midfoot      (Pain of left midfoot [M79.672])    Surgeons: Damion Greenfield MD Responsible Provider: Oscar Ventura DO    Anesthesia Type: MAC, TIVA ASA Status: 2          Anesthesia Type: MAC, TIVA    Quinn Phase I: Quinn Score: 10    Quinn Phase II: Quinn Score: 10      Anesthesia Post Evaluation    Patient location during evaluation: PACU  Level of consciousness: awake and alert  Airway patency: patent  Nausea & Vomiting: no nausea  Complications: no  Cardiovascular status: hemodynamically stable  Respiratory status: acceptable  Hydration status: euvolemic

## 2023-01-04 NOTE — INTERVAL H&P NOTE
Update History & Physical    The Patient's History and Physical was reviewed   I discussed the surgery and patients medical condition with the patient. The chart was reviewed with the patient and I examined the patient. There was no change. The surgical site was confirmed by the patient and me. CV: RRR  RESP: CTAB    Plan:  The risk, benefits, expected outcome, and alternative to the recommended procedure have been discussed with the patient. Patient understands and wants to proceed with the procedure.     Electronically signed by Ledy Jensen MD on 01/04/23 6:45 AM

## 2023-01-04 NOTE — ANESTHESIA PROCEDURE NOTES
Peripheral Block    Patient location during procedure: pre-op  Reason for block: post-op pain management and at surgeon's request  Start time: 1/4/2023 7:26 AM  End time: 1/4/2023 7:31 AM  Staffing  Performed: anesthesiologist   Anesthesiologist: Gemini Laguna DO  Preanesthetic Checklist  Completed: patient identified, IV checked, site marked, risks and benefits discussed, surgical/procedural consents, equipment checked, pre-op evaluation, timeout performed, anesthesia consent given, oxygen available and monitors applied/VS acknowledged  Peripheral Block   Patient position: right lateral decubitus  Prep: ChloraPrep  Provider prep: mask and sterile gloves  Patient monitoring: cardiac monitor, continuous pulse ox, frequent blood pressure checks, responsive to questions and oxygen  Block type: Sciatic  Popliteal  Laterality: left  Injection technique: single-shot  Guidance: ultrasound guided  Local infiltration: lidocaine  Infiltration strength: 1 %  Local infiltration: lidocaine  Dose: 3 mL    Needle   Needle type: insulated echogenic nerve stimulator needle   Needle gauge: 20 G  Needle localization: ultrasound guidance  Needle length: 10 cm  Assessment   Injection assessment: negative aspiration for heme, no paresthesia on injection, local visualized surrounding nerve on ultrasound and no intravascular symptoms  Paresthesia pain: none  Slow fractionated injection: yes  Hemodynamics: stable  Real-time US image taken/store: yes  Outcomes: uncomplicated and patient tolerated procedure well    Additional Notes  R/B/I discussed at length with pt including damage to nerve or muscle. Needle inserted under real time Ultrasound guidance and placed in close proximity to nerve being blocked. Ultrasound was used in real time to visualize spread of local anesthetic around nerve being blocked.   Nerve and surrounding structures appeared grossly normal.  A permanent Ultrasound image was stored in the chart  Medications Administered  dexamethasone 4 MG/ML - Perineural   4 mg - 1/4/2023 7:26:00 AM  mepivacaine 1.5 % - Perineural   10 mL - 1/4/2023 7:26:00 AM  ropivacaine (NAROPIN) injection 0.5% - Perineural   20 mL - 1/4/2023 7:26:00 AM

## 2023-01-04 NOTE — OP NOTE
Operative Note    Patient:Mark Hernandez  MRN: [id number]    Date Of Surgery: 1/4/2023    Surgeon: Jose J Carlson MD    Assistant Surgeon: None    Procedure Performed:   left  Open treatment navicular fracture-nonunion  Talonavicular arthrodesis  Bone graft minor    Pre Op Diagnosis:  Left navicular nonunion  Left talonavicular arthritis    Post Op Diagnosis:   same    Implants:   * No implants in log *    Anesthesia:  Regional    Blood Loss:  30    Tourniquet:  Estimated calf at 250 mmHg- 45minutes    Pre Operative Abx:   Ancef 2g    Specimens/Cultures:  -    Significant Findings:  Sclerotic and unhealthy chronic fracture of the navicular bone with significant talonavicular arthritis. A large portion of the navicular however was bleeding when penetrate with injury also thought he was able to be fused. Both the medial and the lateral fracture halves of the navicular appear to have some component of healthy bone. Pre Operative Course:  Montse Diaz is a 59 y.o. male who has no acute injury of his foot. He states this past summer - 5 months ago in August 2022 -started developing foot and ankle pain after returning from the beach. He treated this with colchicine as he thought it was gout. He then went to an urgent care they did not see a fracture. He was placed into a boot. He then another x-ray in September 2022 which showed sclerosis of the tarsal navicular with potential nonunion and collapse. An MRI was ordered. He then was referred to Dr. Roxie Crawford who worked him up and states that they are concerned about a fracture nonunion. After discussion with him decision was made for open treatment of this fracture along with fusion    Operation In Detail:  Patient was evaluated in the preoperative area. The left lower extremity was marked by me. We had a long discussion about the procedure and postoperative protocols.   The patient was then brought back to the operating room suite and placed in the operating room table. A timeout was taken to identify the patient, procedure being performed, and laterality. After this the patient was prepped and draped in the normal sterile fashion using a Betadine solution and/or a ChloraPrep solution. A timeout was then taken to identify the patient his name, date of birth, laterality, and procedure being performed. We also identified allergies and any concerns about the operation. Attention was then placed to the operative extremity. Antibiotics, 2 g IV Ancef was administered. An Esmarch tourniquet was then used to examine the leg and calf tourniquet was inflated to 250 mmHg    I identified the calcaneal tuberosity. A separate small lateral 3 cm incision was made centered the calcaneal tuberosity, away from the other operative site. Using a 15 blade we cut through skin, and we used scissors to go down to bone. The sural nerve was not encountered. I then palpated the bone with a freerer to make sure I was on bone. Next the accumed bone harvester passed into the calcaneus to harvest bone from the calcaneus. A good amount of bone graft was taken with the harvester and placed into a cup. The calcaneous wound was then irrigated and closed with nylon suture. I did identify the anterior tibial tendon. I made an incision just lateral to the anterior tibial tendon of the navicular bone. I used x-ray to identify the navicular prior to the incision. This was a longitudinal incision. I incised the skin and bluntly dissected down to the deep fascia. I then incised the deep fascia and bluntly dissected down to the navicular bone. The neurovascular bundle was not encountered with several peripheral nerve which were. I then placed a deep retractor to the wound and subperiosteally dissected off tissue off the navicular bone. The navicular bone was sclerotic with a chronic fracture nonunion at the central aspect. The bone was not very healthy however it was hard. Identified the fracture site at this navicular bone. Using a rongeur, curette and osteotome I debrided the sclerotic edges of the fracture site to get more healthy bone. I treated this fracture with excision of some of the nonunion pieces and with debridement of the nonunion site. After had treated this fracture with a debridement and excision of some of the nonunion sites of pain attention to the talonavicular joint. Using a curette, a rongeur, a saw, and a osteotome with denuded all the chondral surface off the talar head. There is already significant chondral wear in the talar head and the navicular however I continue to remove any remaining chondral surface. After completely removing all the chondral surface I prepped the subchondral bone by using an osteotome and a drill to fenestrate and damaged the bone to expose good subchondral healthier bone. I was able to expose good healthy bone. After preparing the talonavicular joint I paid attention back to the nonunion site. Identified the fracture of the navicular. I placed him with a calcaneal bone graft mixed with some of the [facility] bone graft into this nonunion site. I then clamped the fracture check to reduce the fracture and grade 1 hole navicular piece. I then placed more bone graft into the talonavicular joint and then clamped the talonavicular joint to reduce it. X-ray confirmed good positioning of the navicular to the talus. I then placed 2 staples from the medial navicular into the talus to compress the talonavicular joint medially. Then went to the underside of fracture gap and placed 1 from the lateral navicular body into the talus. This provided 3 staples to the medial, one lateral to compress the joint. There compressed well. Graft was noted to extrude. I placed more graft within the fracture site. I then copiously irrigated out the wounds. Closing the subcutaneous Monocryl and 3-0 nylon.     X-rays were taken and showed good stable fixation with plate compression of the talonavicular joint. There is no signs of instability with range of motion. A sterile dressing was then applied to the leg and Posterior slab splint with Stirrups. They were awoken from anesthesia and returned to the PACU without difficulty.     Post Operative Plan:   1- WB status: Non-Weight Bearing   2- Follow Up: 2-3 weeks  3- Immobilization/assistive devices: brace/splint  4- DVT px: ASA 81 mg BID  5- Pain Medication: percocet

## 2023-01-04 NOTE — ANESTHESIA PRE PROCEDURE
Department of Anesthesiology  Preprocedure Note       Name:  Kristopher De La Paz   Age:  59 y.o.  :  1965                                          MRN:  [id number]         Date:  2023      Surgeon: Miki Gonzalez):  Lou Barnett MD    Procedure: Procedure(s):  left navicular cuneiform fusion    Medications prior to admission:   Prior to Admission medications    Medication Sig Start Date End Date Taking? Authorizing Provider   oxyCODONE-acetaminophen (PERCOCET) 5-325 MG per tablet Take 1 tablet by mouth every 6 hours as needed for Pain for up to 5 days. Intended supply: 5 days.  Take lowest dose possible to manage pain Max Daily Amount: 4 tablets 1/3/23 1/8/23  Luo Barnett MD   docusate sodium (COLACE) 100 MG capsule Take 1 capsule by mouth daily as needed for Constipation 1/3/23   Lou Barnett MD   aspirin (ASPIRIN 81) 81 MG EC tablet Take 1 tablet by mouth in the morning and at bedtime 1/3/23   Lou Barnett MD   ondansetron [facility]) 4 MG tablet Take 1 tablet by mouth daily as needed for Nausea or Vomiting 1/3/23   Lou Barnett MD   meloxicam (MOBIC) 15 MG tablet Take 15 mg by mouth at bedtime 22   Historical Provider, MD   allopurinol (ZYLOPRIM) 100 MG tablet Take 100 mg by mouth at bedtime 22   Historical Provider, MD   omeprazole (PRILOSEC OTC) 20 MG tablet Take 20 mg by mouth at bedtime 20   Historical Provider, MD       Current medications:    Current Facility-Administered Medications   Medication Dose Route Frequency Provider Last Rate Last Admin    ceFAZolin (ANCEF) 2000 mg in sterile water 20 mL IV syringe  2,000 mg IntraVENous On Call to [address] [place] [address], MD        sodium chloride flush 0.9 % injection 5-40 mL  5-40 mL IntraVENous 2 times per day Lou Barnett MD        sodium chloride flush 0.9 % injection 5-40 mL  5-40 mL IntraVENous PRN Lou Barnett MD        0.9 % sodium chloride infusion   IntraVENous PRN Lou Barnett MD        lidocaine 1 % injection 1 mL 1 mL IntraDERmal Once PRN Lisandro Pancoast Friskey, DO        sodium chloride flush 0.9 % injection 5-40 mL  5-40 mL IntraVENous 2 times per day Lisandro Pancoast Friskey, DO        sodium chloride flush 0.9 % injection 5-40 mL  5-40 mL IntraVENous PRN Lisandro Pancoast Fredrich Osman, DO        0.9 % sodium chloride infusion   IntraVENous PRN Lisandro Pancoast Fredrich Osman, DO           Allergies:  No Known Allergies    Problem List:    Patient Active Problem List   Diagnosis Code    Pain of left midfoot M79.672       Past Medical History:        Diagnosis Date    GERD (gastroesophageal reflux disease)     meds taken daily - sleeps elevated    Gout     allpurinol       Past Surgical History:        Procedure Laterality Date    HERNIA REPAIR      23- 16 years old       Social History:    Social History     Tobacco Use    Smoking status: Never    Smokeless tobacco: Never   Substance Use Topics    Alcohol use: Yes     Comment: rare use - once or twice a year                                Counseling given: Not Answered      Vital Signs (Current):   Vitals:    12/28/22 1245 01/04/23 0659 01/04/23 0707 01/04/23 0733   BP:  (!) 194/84  (!) 176/76   Pulse:  63  74   Resp:  18  14   Temp:  98.3 °F (36.8 °C)     TempSrc:  Oral     SpO2:  95%  100%   Weight: 225 lb (102.1 kg) 225 lb (102.1 kg) 225 lb (102.1 kg)    Height: 5' 10\" (1.778 m)  5' 10\" (1.778 m)                                               BP Readings from Last 3 Encounters:   01/04/23 (!) 176/76       NPO Status: Time of last liquid consumption: 2200                        Time of last solid consumption: 2200                        Date of last liquid consumption: 01/03/23                        Date of last solid food consumption: 01/03/23    BMI:   Wt Readings from Last 3 Encounters:   01/04/23 225 lb (102.1 kg)   12/16/22 225 lb (102.1 kg)   12/13/22 220 lb (99.8 kg)     Body mass index is 32.28 kg/m².     CBC: No results found for: WBC, RBC, HGB, HCT, MCV, RDW, PLT    CMP: No results found for: NA, K, CL, CO2, BUN, CREATININE, GFRAA, AGRATIO, LABGLOM, GLUCOSE, GLU, PROT, CALCIUM, BILITOT, ALKPHOS, AST, ALT    POC Tests: No results for input(s): POCGLU, POCNA, POCK, POCCL, POCBUN, POCHEMO, POCHCT in the last 72 hours. Coags: No results found for: PROTIME, INR, APTT    HCG (If Applicable): No results found for: PREGTESTUR, PREGSERUM, HCG, HCGQUANT     ABGs: No results found for: PHART, PO2ART, [id number], NMY9QWJ, BEART, L5IQYTJK     Type & Screen (If Applicable):  No results found for: LABABO, LABRH    Drug/Infectious Status (If Applicable):  No results found for: HIV, HEPCAB    COVID-19 Screening (If Applicable): No results found for: COVID19        Anesthesia Evaluation  Patient summary reviewed  Airway: Mallampati: II          Dental: normal exam         Pulmonary:Negative Pulmonary ROS breath sounds clear to auscultation                             Cardiovascular:Negative CV ROS  Exercise tolerance: good (>4 METS),           Rhythm: regular  Rate: normal                    Neuro/Psych:   Negative Neuro/Psych ROS              GI/Hepatic/Renal:   (+) GERD: well controlled,           Endo/Other: Negative Endo/Other ROS                    Abdominal:             Vascular: negative vascular ROS. Other Findings:           Anesthesia Plan      TIVA     ASA 2     (R/b/i of peripheral nerve blocks and anesthetic discussed at length with pt)      MIPS: Postoperative opioids intended. Anesthetic plan and risks discussed with patient and sibling. Plan discussed with CRNA.           Post-op pain plan if not by surgeon: single peripheral nerve block            Jewell Noland,    1/4/2023

## 2023-01-05 ENCOUNTER — TELEPHONE (OUTPATIENT)
Dept: ORTHOPEDIC SURGERY | Age: 58
End: 2023-01-05

## 2023-01-06 ENCOUNTER — TELEPHONE (OUTPATIENT)
Dept: ORTHOPEDIC SURGERY | Age: 58
End: 2023-01-06

## 2023-01-06 NOTE — TELEPHONE ENCOUNTER
He had surgery Wednesday. The pain meds are not controlling the pain. He isn't sleeping at night due to the throbbing pain. Can he take the meds more often or what do you suggest? Please give him a call.

## 2023-01-27 ENCOUNTER — OFFICE VISIT (OUTPATIENT)
Dept: ORTHOPEDIC SURGERY | Age: 58
End: 2023-01-27

## 2023-01-27 DIAGNOSIS — M79.672 LEFT FOOT PAIN: ICD-10-CM

## 2023-01-27 DIAGNOSIS — S92.252A CLOSED DISPLACED FRACTURE OF NAVICULAR BONE OF LEFT FOOT, INITIAL ENCOUNTER: Primary | ICD-10-CM

## 2023-01-27 DIAGNOSIS — M87.076 AVASCULAR NECROSIS OF BONE OF FOOT (HCC): ICD-10-CM

## 2023-01-27 NOTE — PROGRESS NOTES
Name: Aron Abdi  YOB: 1965  Gender: male  MRN: [id number]      Procedure: left navicular cuneiform fusion - Left    Surgery Date: 1/4/2023  POD -   POV -    Subjective: Denies fevers chills or infection. Denies any signs of spreading erythema. Exam: Wound healing appropriately. No signs of dehiscence or infection. No signs of erythema or drainage. With toes warm and well-perfused. Wounds: appears to be healing well with good reapproximation, healing well , sutures in place and were removed without difficulty  Edema/swelling: mild at the incision and surgical site  Tenderness: mild at the incision and surgical site    Neuro:  normal SILT to s/s/sp/dp/t. Reflexes normal: 1+ patella reflex bilaterally, 1+ achilles reflex bilaterally, negative babinski bilaterally. no signs of hyper reflexia or absent reflex    Vascular: BLE: 2+ DP pulse, toes wwp w/ BCR<2s    Imaging:   I independently interpreted XR taken today and   X-Ray LEFT Foot 3 vw (AP/Lateral/Oblique) for foot pain   Findings: Stable talonavicular fusion. No signs of displacement. 3 staples in the foot in place. Impression: Stable talonavicular fusion   Signature: Dianne Lopez MD       Complications post op:    Assessment/Plan:    Short Leg Cast was applied  DVT px: ASA 81 mg BID  Studies ordered:  Foot XR needed @ Next Visit    Weight-bearing status: NWB          Return to work/work restrictions: none  No medications given    Next visit he can begin  HWB in boot Walking

## 2023-02-17 ENCOUNTER — OFFICE VISIT (OUTPATIENT)
Dept: ORTHOPEDIC SURGERY | Age: 58
End: 2023-02-17

## 2023-02-17 DIAGNOSIS — S92.252A CLOSED DISPLACED FRACTURE OF NAVICULAR BONE OF LEFT FOOT, INITIAL ENCOUNTER: Primary | ICD-10-CM

## 2023-02-17 NOTE — PROGRESS NOTES
The patient was prescribed a walker boot for the patient's left foot. The patient wears a size 11 shoe and I fitted them with a L size boot. The patient was fitted and instructed on the use of prescribed walker boot. I explained how to fit themselves and that the plastic flexible piece should always be on the front of the boot and secured by the Velcro straps on top. The air bladder in the boot was adjusted according to proper fit and comfort. The patient walked a short distance and acknowledged satisfaction with current fit. I also explained that they need a heel lift or a higher heeled shoe for the uninvolved LE to help normalize gait and avoid excessive low back stress/strain due to leg length inequality created from walker boot. Patient read and signed documenting they understand and agree to [facility]'s current DME return policy.

## 2023-02-17 NOTE — PROGRESS NOTES
Name: Ronny Juarez  YOB: 1965  Gender: male  MRN: [id number]      Procedure: left navicular cuneiform fusion - Left    Surgery Date: 1/4/2023  POD -   POV -    Subjective: Denies fevers chills or infection. Denies any signs of spreading erythema. He has been very compliant with nonweightbearing. He is doing very well. Denies fevers chills or infection    Exam: Wound healing appropriately. No signs of dehiscence or infection. No signs of erythema or drainage. With toes warm and well-perfused. Wounds: appears to be healing well with good reapproximation, there is a little bit of maceration of the surgical site but this is very minimal.  Edema/swelling: mild at the incision and surgical site  Tenderness: mild at the incision and surgical site    Neuro:  normal SILT to s/s/sp/dp/t. Reflexes normal: 1+ patella reflex bilaterally, 1+ achilles reflex bilaterally, negative babinski bilaterally. no signs of hyper reflexia or absent reflex    Vascular: BLE: 2+ DP pulse, toes wwp w/ BCR<2s    Imaging:   I independently interpreted XR taken today and   X-Ray LEFT Foot 3 vw (AP/Lateral/Oblique) for foot pain   Findings: Stable talonavicular fusion. No signs of displacement. 3 staples in the foot in place. Impression: Stable talonavicular fusion   Signature: Alberta Duggan MD       Complications post op:    Assessment/Plan:    CAM Walker boot applied today  Studies ordered: Foot XR needed @ Next Visit    Weight-bearing status: Heel weightbearing in boot    Return to work/work restrictions: none  No medications given    He can begin walking on his heel in 4 weeks.   Follow-up in 6 weeks with x-rays standing of the foot

## 2023-03-31 ENCOUNTER — OFFICE VISIT (OUTPATIENT)
Dept: ORTHOPEDIC SURGERY | Age: 58
End: 2023-03-31

## 2023-03-31 DIAGNOSIS — S92.252A CLOSED DISPLACED FRACTURE OF NAVICULAR BONE OF LEFT FOOT, INITIAL ENCOUNTER: Primary | ICD-10-CM

## 2023-03-31 NOTE — PROGRESS NOTES
Patient was fitted and instructed on a Carbon Fiber Insert for the left foot. Patient read and signed documenting they understand and agree to [facility]'s current DME return policy.

## 2023-03-31 NOTE — PROGRESS NOTES
Name: Albino Tapia  YOB: 1965  Gender: male  MRN: [id number]      Procedure: left navicular cuneiform fusion - Left    Surgery Date: 1/4/2023  POD -   POV -    Subjective: Denies fevers chills or infection. Denies any signs of spreading erythema. He has been very compliant with boot and slow return to WB. He is doing very well. Denies fevers chills or infection  He has been WB in a boot for 2 week    Exam: Wound healing appropriately. No signs of dehiscence or infection. No signs of erythema or drainage. With toes warm and well-perfused. Wounds: appears to be healing well with good reapproximation, there is a little bit of maceration of the surgical site but this is very minimal.  Edema/swelling: mild at the incision and surgical site  Tenderness: mild at the incision and surgical site    Neuro:  normal SILT to s/s/sp/dp/t. Reflexes normal: 1+ patella reflex bilaterally, 1+ achilles reflex bilaterally, negative babinski bilaterally. no signs of hyper reflexia or absent reflex    Vascular: BLE: 2+ DP pulse, toes wwp w/ BCR<2s    Imaging:   I independently interpreted XR taken today and   X-Ray LEFT Foot 3 vw (AP/Lateral/Oblique) for foot pain   Findings: Stable talonavicular fusion. No signs of displacement. 3 staples in the foot in place. The fusion site appears to be consolidated. Impression: Stable talonavicular fusion   Signature: Kenn Adkins MD       Complications post op:    Assessment/Plan:    Weight-bear as tolerated in cam walker boot for 4 weeks  Transition to normal shoe with a carbon fiber insert starting 4 weeks  Begin physical therapy    Return to work on May 1st.  1-3 hours standing for 1 hour sitting.     Follow-up in 3 months-with x-rays if he has painful

## 2023-04-20 ENCOUNTER — CLINICAL DOCUMENTATION (OUTPATIENT)
Dept: ORTHOPEDIC SURGERY | Age: 58
End: 2023-04-20

## 2023-04-28 ENCOUNTER — CLINICAL DOCUMENTATION (OUTPATIENT)
Dept: ORTHOPEDIC SURGERY | Age: 58
End: 2023-04-28

## 2023-05-16 ENCOUNTER — CLINICAL DOCUMENTATION (OUTPATIENT)
Dept: ORTHOPEDIC SURGERY | Age: 58
End: 2023-05-16

## 2023-06-01 ENCOUNTER — CLINICAL DOCUMENTATION (OUTPATIENT)
Dept: ORTHOPEDIC SURGERY | Age: 58
End: 2023-06-01

## 2023-06-05 ENCOUNTER — CLINICAL DOCUMENTATION (OUTPATIENT)
Dept: ORTHOPEDIC SURGERY | Age: 58
End: 2023-06-05

## 2023-06-23 ENCOUNTER — OFFICE VISIT (OUTPATIENT)
Dept: ORTHOPEDIC SURGERY | Age: 58
End: 2023-06-23

## 2023-06-23 DIAGNOSIS — M79.672 PAIN OF LEFT MIDFOOT: Primary | ICD-10-CM

## 2023-06-23 NOTE — PROGRESS NOTES
Name: Laura Walls  YOB: 1965  Gender: male  MRN: [id number]      Procedure: left navicular cuneiform fusion - Left    Surgery Date: 1/4/2023  POD -   POV -    Subjective: Denies fevers chills or infection. Denies any signs of spreading erythema. He has been very compliant with weightbearing. He is improving. He is back at work at [employer]. At this level his pain is a 2out of 10. He states he can stand for a while but cannot stand all day. He still some swelling and some achiness in his foot. Exam: Wound healing appropriately. No signs of dehiscence or infection. No signs of erythema or drainage. With toes warm and well-perfused. Wounds: appears to be healing well with good reapproximation, there is a little bit of maceration of the surgical site but this is very minimal.  Edema/swelling: mild at the incision and surgical site  Tenderness: mild at the incision and surgical site  He is able to plantarflex and dorsiflex his ankle without difficulty. Neuro:  normal SILT to s/s/sp/dp/t. Reflexes normal: 1+ patella reflex bilaterally, 1+ achilles reflex bilaterally, negative babinski bilaterally. no signs of hyper reflexia or absent reflex    Vascular: BLE: 2+ DP pulse, toes wwp w/ BCR<2s    Imaging:   I independently interpreted XR taken today and   X-Ray LEFT Foot 3 vw (AP/Lateral/Oblique) for foot pain   Findings: Stable talonavicular fusion. No signs of displacement. 3 staples in the foot in place. The fusion site appears to be consolidated.    Impression: Stable talonavicular fusion   Signature: Jan Atkinson MD       Complications post op:    Assessment/Plan:    He is doing very well with only minimal amount of swelling and pain at end of the day  Follow-up on an as-needed basis  Take Advil Motrin Aleve as needed

## 2023-06-30 ENCOUNTER — CLINICAL DOCUMENTATION (OUTPATIENT)
Dept: ORTHOPEDIC SURGERY | Age: 58
End: 2023-06-30

## 2023-07-10 ENCOUNTER — CLINICAL DOCUMENTATION (OUTPATIENT)
Dept: ORTHOPEDIC SURGERY | Age: 58
End: 2023-07-10

## (undated) DEVICE — GLOVE ORTHO 8   MSG9480

## (undated) DEVICE — BANDAGE,ELASTIC,ESMARK,STERILE,4"X9',LF: Brand: MEDLINE

## (undated) DEVICE — FOOT DR TOLLISON & WOMACK: Brand: MEDLINE INDUSTRIES, INC.

## (undated) DEVICE — GLOVE ORANGE PI 7 1/2   MSG9075

## (undated) DEVICE — DRAPE C ARM W54XL84IN MINI FOR OEC 6800

## (undated) DEVICE — GLOVE SURG SZ 85 L12IN FNGR ORTHO 126MIL CRM LTX FREE

## (undated) DEVICE — STERILE PVP: Brand: MEDLINE INDUSTRIES, INC.

## (undated) DEVICE — SPLINT THMB W4XL30IN FBRGLS PD PRECUT LTWT DURABLE FAST SET

## (undated) DEVICE — SOLUTION IRRIG 1000ML 09% SOD CHL USP PIC PLAS CONTAINER

## (undated) DEVICE — STEINMANN PIN, SMOOTH
Type: IMPLANTABLE DEVICE | Site: FOOT | Status: NON-FUNCTIONAL
Brand: VARIAX
Removed: 2023-01-04

## (undated) DEVICE — JOINT PREP INSTRUMENT KIT: Brand: ORTHOLOC™ 2

## (undated) DEVICE — DYNAMIC COMPRESSION SYSTEM: Brand: EASYFUSE

## (undated) DEVICE — GLOVE SURG SZ 8 L12IN FNGR THK79MIL GRN LTX FREE

## (undated) DEVICE — SHEET,DRAPE,53X77,STERILE: Brand: MEDLINE

## (undated) DEVICE — GOWN,SIRUS,NONRNF,SETINSLV,XL,20/CS: Brand: MEDLINE